# Patient Record
Sex: FEMALE | NOT HISPANIC OR LATINO | URBAN - METROPOLITAN AREA
[De-identification: names, ages, dates, MRNs, and addresses within clinical notes are randomized per-mention and may not be internally consistent; named-entity substitution may affect disease eponyms.]

---

## 2021-11-09 ENCOUNTER — APPOINTMENT (RX ONLY)
Dept: URBAN - METROPOLITAN AREA CLINIC 371 | Facility: CLINIC | Age: 51
Setting detail: DERMATOLOGY
End: 2021-11-09

## 2021-11-09 DIAGNOSIS — D22 MELANOCYTIC NEVI: ICD-10-CM

## 2021-11-09 DIAGNOSIS — D18.0 HEMANGIOMA: ICD-10-CM

## 2021-11-09 DIAGNOSIS — Z71.89 OTHER SPECIFIED COUNSELING: ICD-10-CM

## 2021-11-09 DIAGNOSIS — L81.4 OTHER MELANIN HYPERPIGMENTATION: ICD-10-CM

## 2021-11-09 DIAGNOSIS — L82.1 OTHER SEBORRHEIC KERATOSIS: ICD-10-CM

## 2021-11-09 PROBLEM — D18.01 HEMANGIOMA OF SKIN AND SUBCUTANEOUS TISSUE: Status: ACTIVE | Noted: 2021-11-09

## 2021-11-09 PROBLEM — D22.5 MELANOCYTIC NEVI OF TRUNK: Status: ACTIVE | Noted: 2021-11-09

## 2021-11-09 PROCEDURE — ? COUNSELING

## 2021-11-09 ASSESSMENT — LOCATION DETAILED DESCRIPTION DERM
LOCATION DETAILED: EPIGASTRIC SKIN
LOCATION DETAILED: MIDDLE STERNUM
LOCATION DETAILED: LEFT LATERAL MALAR CHEEK

## 2021-11-09 ASSESSMENT — LOCATION ZONE DERM
LOCATION ZONE: TRUNK
LOCATION ZONE: FACE

## 2021-11-09 ASSESSMENT — LOCATION SIMPLE DESCRIPTION DERM
LOCATION SIMPLE: CHEST
LOCATION SIMPLE: LEFT CHEEK
LOCATION SIMPLE: ABDOMEN

## 2025-03-19 ENCOUNTER — APPOINTMENT (OUTPATIENT)
Dept: URBAN - METROPOLITAN AREA CLINIC 338 | Facility: CLINIC | Age: 55
Setting detail: DERMATOLOGY
End: 2025-03-19

## 2025-03-19 DIAGNOSIS — Z41.9 ENCOUNTER FOR PROCEDURE FOR PURPOSES OTHER THAN REMEDYING HEALTH STATE, UNSPECIFIED: ICD-10-CM

## 2025-03-19 PROCEDURE — ? COSMETIC CONSULTATION: LIPOSUCTION

## 2025-03-19 PROCEDURE — ? COSMETIC CONSULTATION: LASER RESURFACING

## 2025-03-19 PROCEDURE — ? TREATMENT REGIMEN

## 2025-03-19 PROCEDURE — ? ADDITIONAL NOTES

## 2025-03-19 PROCEDURE — ? COSMETIC CONSULTATION: BLEPHAROPLASTY

## 2025-03-19 ASSESSMENT — LOCATION SIMPLE DESCRIPTION DERM
LOCATION SIMPLE: RIGHT INFERIOR EYELID
LOCATION SIMPLE: LEFT INFERIOR EYELID

## 2025-03-19 ASSESSMENT — LOCATION DETAILED DESCRIPTION DERM
LOCATION DETAILED: RIGHT MEDIAL INFERIOR EYELID
LOCATION DETAILED: LEFT MEDIAL INFERIOR EYELID

## 2025-03-19 ASSESSMENT — LOCATION ZONE DERM: LOCATION ZONE: EYELID

## 2025-03-19 NOTE — PROCEDURE: TREATMENT REGIMEN
Detail Level: Zone
Initiate Treatment: Wash face with SBS foaming cleanser\\nApply SBS Eventone a thin layer to face \\nApply Alto advanced a thin layer to face  \\nApply moisturizer with SPF\\n\\nWash face with SBS foaming cleanser \\nApply topix eye cream to area around eyes \\nApply SBS Eventone a thin layer to face \\nApply Alto advanced a thin layer to face  \\nApply moisturizer

## 2025-03-19 NOTE — PROCEDURE: ADDITIONAL NOTES
Additional Notes: Discussed risks, benefits, and expectations of accutite in full detail. Pt interested in breast reduction but discussed accutite will target more of the tightening of the skin. Advised to follow up with a plastic surgeon since accutite would not fulfill the results she is looking for.
Detail Level: Simple
Render Risk Assessment In Note?: no

## 2025-04-05 ENCOUNTER — APPOINTMENT (OUTPATIENT)
Dept: URBAN - METROPOLITAN AREA CLINIC 338 | Facility: CLINIC | Age: 55
Setting detail: DERMATOLOGY
End: 2025-04-05

## 2025-04-05 DIAGNOSIS — Z41.9 ENCOUNTER FOR PROCEDURE FOR PURPOSES OTHER THAN REMEDYING HEALTH STATE, UNSPECIFIED: ICD-10-CM

## 2025-04-05 DIAGNOSIS — L81.4 OTHER MELANIN HYPERPIGMENTATION: ICD-10-CM

## 2025-04-05 PROCEDURE — ? IPL COSMETIC

## 2025-04-05 PROCEDURE — ? COUNSELING

## 2025-04-05 ASSESSMENT — LOCATION DETAILED DESCRIPTION DERM
LOCATION DETAILED: RIGHT INFERIOR CENTRAL MALAR CHEEK
LOCATION DETAILED: LEFT CHIN
LOCATION DETAILED: LEFT INFERIOR CENTRAL MALAR CHEEK
LOCATION DETAILED: RIGHT INFERIOR MEDIAL FOREHEAD

## 2025-04-05 ASSESSMENT — LOCATION SIMPLE DESCRIPTION DERM
LOCATION SIMPLE: RIGHT FOREHEAD
LOCATION SIMPLE: RIGHT CHEEK
LOCATION SIMPLE: LEFT CHEEK
LOCATION SIMPLE: CHIN

## 2025-04-05 ASSESSMENT — LOCATION ZONE DERM: LOCATION ZONE: FACE

## 2025-04-05 NOTE — HPI: COSMETIC (LASER RESURFACING)
Eye Color: zakiya
Have You Had Laser Resurfacing Before?: has not had previous treatments
When Outside In The Sun, Do You...: mildly burns, tans slowly

## 2025-04-05 NOTE — PROCEDURE: IPL COSMETIC
Show Price In Visit Note: No
Number Of Passes: 2
Pulse Energy (Include Units): 1.50hz
Ipl Type: Columba
Location (Required For Details To Render): Full face
Consent: Prior to the procedure consent obtained, risks reviewed including but not limited to crusting, scabbing, blistering, scarring, darker or lighter pigmentary change, incidental hair removal, bruising, and/or incomplete removal.
Pulse Duration (Include Units): 3ms
Eye Protection: Laser Aid
Indication: lentigines and telangiectasia
Wavelength (Include Units): PRODEEP
Post-Care Instructions: I reviewed with the patient in detail post-care instructions. Patient should stay away from the sun and wear sun protection until treated areas are fully healed.
Number Of Passes: 1
Wavelength (Include Units): 515nm filter
Fluence (Include Units): 100mj/cobos
Pre-Procedure Care: Prior to the procedure the patient and all present had protective eyewear in place and a warning sign was placed on the door.
Detail Level: Zone
Spotsize (Include Units): 40x12
Spotsize (Include Units): 8mm
CoolinC
Fluence (Include Units): 14J
Add Additional Location: Yes
Pulse Duration (Include Units): 15ms
End-Point And Post-Procedure Care: The procedure continued until mild purpura was noted.  Immediately following the procedure, Vaseline and ice applied. Post care reviewed with patient.

## 2025-05-06 ENCOUNTER — APPOINTMENT (OUTPATIENT)
Dept: URBAN - METROPOLITAN AREA CLINIC 338 | Facility: CLINIC | Age: 55
Setting detail: DERMATOLOGY
End: 2025-05-06

## 2025-05-06 ENCOUNTER — RX ONLY (RX ONLY)
Age: 55
End: 2025-05-06

## 2025-05-06 DIAGNOSIS — L81.4 OTHER MELANIN HYPERPIGMENTATION: ICD-10-CM

## 2025-05-06 DIAGNOSIS — Z41.9 ENCOUNTER FOR PROCEDURE FOR PURPOSES OTHER THAN REMEDYING HEALTH STATE, UNSPECIFIED: ICD-10-CM

## 2025-05-06 PROCEDURE — ? IPL COSMETIC

## 2025-05-06 PROCEDURE — ? PRESCRIPTION

## 2025-05-06 PROCEDURE — ? COUNSELING

## 2025-05-06 PROCEDURE — ? TREATMENT REGIMEN

## 2025-05-06 RX ORDER — TOBRAMYCIN AND DEXAMETHASONE 3; 1 MG/G; MG/G
OINTMENT OPHTHALMIC
Qty: 3.5 | Refills: 0 | Status: ERX | COMMUNITY
Start: 2025-05-06

## 2025-05-06 RX ORDER — NA MG FL/NA PHO/NACL/HA/NA HYP
GEL (GRAM) TOPICAL
Qty: 80 | Refills: 0 | Status: ERX | COMMUNITY
Start: 2025-05-06

## 2025-05-06 RX ORDER — HYDROCODONE BITARTRATE AND ACETAMINOPHEN 5; 325 MG/1; MG/1
TABLET ORAL
Qty: 16 | Refills: 0 | Status: ERX | COMMUNITY
Start: 2025-05-06

## 2025-05-06 RX ORDER — CEPHALEXIN 500 MG/1
CAPSULE ORAL
Qty: 18 | Refills: 0 | Status: ERX | COMMUNITY
Start: 2025-05-06

## 2025-05-06 RX ORDER — METHYLPREDNISOLONE 4 MG/1
TABLET ORAL
Qty: 1 | Refills: 0 | Status: ERX | COMMUNITY
Start: 2025-05-06

## 2025-05-06 RX ORDER — DIAZEPAM 10 MG/1
TABLET ORAL
Qty: 1 | Refills: 0 | Status: ERX | COMMUNITY
Start: 2025-05-06

## 2025-05-06 RX ADMIN — Medication: at 00:00

## 2025-05-06 RX ADMIN — DIAZEPAM: 10 TABLET ORAL at 00:00

## 2025-05-06 RX ADMIN — TOBRAMYCIN AND DEXAMETHASONE: 3; 1 OINTMENT OPHTHALMIC at 00:00

## 2025-05-06 RX ADMIN — CEPHALEXIN: 500 CAPSULE ORAL at 00:00

## 2025-05-06 RX ADMIN — HYDROCODONE BITARTRATE AND ACETAMINOPHEN: 5; 325 TABLET ORAL at 00:00

## 2025-05-06 ASSESSMENT — LOCATION SIMPLE DESCRIPTION DERM
LOCATION SIMPLE: CHIN
LOCATION SIMPLE: RIGHT CHEEK
LOCATION SIMPLE: RIGHT FOREHEAD
LOCATION SIMPLE: LEFT CHEEK

## 2025-05-06 ASSESSMENT — LOCATION DETAILED DESCRIPTION DERM
LOCATION DETAILED: LEFT CHIN
LOCATION DETAILED: LEFT INFERIOR CENTRAL MALAR CHEEK
LOCATION DETAILED: RIGHT INFERIOR MEDIAL FOREHEAD
LOCATION DETAILED: RIGHT INFERIOR CENTRAL MALAR CHEEK

## 2025-05-06 ASSESSMENT — LOCATION ZONE DERM: LOCATION ZONE: FACE

## 2025-05-06 NOTE — PROCEDURE: IPL COSMETIC
Show Price In Visit Note: No
Number Of Passes: 2
Pulse Energy (Include Units): 1.50hz
Ipl Type: Columba
Location (Required For Details To Render): Full face
Consent: Prior to the procedure consent obtained, risks reviewed including but not limited to crusting, scabbing, blistering, scarring, darker or lighter pigmentary change, incidental hair removal, bruising, and/or incomplete removal.
Pulse Duration (Include Units): 3ms
Eye Protection: Laser Aid
Indication: lentigines and telangiectasia
Wavelength (Include Units): PRODEEP
Post-Care Instructions: I reviewed with the patient in detail post-care instructions. Patient should stay away from the sun and wear sun protection until treated areas are fully healed.
Number Of Passes: 1
Wavelength (Include Units): 540nm filter
Fluence (Include Units): 110mj/cobos
Pre-Procedure Care: Prior to the procedure the patient and all present had protective eyewear in place and a warning sign was placed on the door.
Detail Level: Zone
Spotsize (Include Units): 40x12
Spotsize (Include Units): 8mm
CoolinC
Fluence (Include Units): 15J
Add Additional Location: Yes
Pulse Duration (Include Units): 15ms
End-Point And Post-Procedure Care: The procedure continued until mild purpura was noted.  Immediately following the procedure, Vaseline and ice applied. Post care reviewed with patient.

## 2025-05-06 NOTE — PROCEDURE: TREATMENT REGIMEN
Detail Level: Zone
Plan: Called eye doctor's office request most recent eye exam note - faxed to our office\\nconsent reviewed & signed\\nBring all prescriptions to the appointment\\n\\nWash area with Acuicyn eye wash the day of then once daily\\nApply Tobradex ointment to the areas twice daily\\n\\nOrally:\\n-Take 1 tablet of hydrocodone once roomed by medical assistant for procedure then 1 tablet every 4-6 hours as needed for pain\\n-Take one tablet of valium on arrival for procedure\\n-Take 4 tabs of kelfex once roomed by medical assistant then take one tab twice daily x 7 days\\n- Take day one of medrol dose pack with breakfast the day of then finish the following day and forward until finished as directed.\\nAvoid caffeine for 24 hours prior to procedure

## 2025-05-09 ENCOUNTER — RX ONLY (RX ONLY)
Age: 55
End: 2025-05-09

## 2025-05-09 RX ORDER — ERYTHROMYCIN 5 MG/G
OINTMENT OPHTHALMIC
Qty: 3.5 | Refills: 1 | Status: ERX | COMMUNITY
Start: 2025-05-09

## 2025-05-10 ENCOUNTER — APPOINTMENT (OUTPATIENT)
Dept: URBAN - METROPOLITAN AREA CLINIC 337 | Facility: CLINIC | Age: 55
Setting detail: DERMATOLOGY
End: 2025-05-10

## 2025-05-10 DIAGNOSIS — Z41.9 ENCOUNTER FOR PROCEDURE FOR PURPOSES OTHER THAN REMEDYING HEALTH STATE, UNSPECIFIED: ICD-10-CM

## 2025-05-10 PROCEDURE — ? BLEPHAROPLASTY

## 2025-05-10 ASSESSMENT — LOCATION ZONE DERM: LOCATION ZONE: EYELID

## 2025-05-10 ASSESSMENT — LOCATION DETAILED DESCRIPTION DERM: LOCATION DETAILED: RIGHT SUPRATARSAL CREASE

## 2025-05-10 ASSESSMENT — LOCATION SIMPLE DESCRIPTION DERM: LOCATION SIMPLE: RIGHT SUPERIOR EYELID

## 2025-05-10 NOTE — PROCEDURE: BLEPHAROPLASTY
Detail Level: Simple
Estimated Blood Loss (Cc): minimal
Intro: The patient was prepped with 0.01% hypochlorous acid in dilute saline solution on the skin, and a few drops of tetracaine solution were placed in the interior fornix. A drape was placed over the eyes in the usual sterile fashion.
Upper Eyelid Blepharoplasty: A 15 blade was used to make a skin incision along the elliptical demarcation. Scissors were used to excise the skin and underlying orbicularis muscle.  All active bleeding points were meticulously controlled with electrocautery.
Skin Closure Sutures: 6-0 Prolene
Lower Eyelid Blepharoplasty I: A 4-0 silk traction suture was placed through the lower lid margins and traction was applied superiorly. A 15 blade was used to make an infraciliary incision across the length of the eyelid extending  beyond the lateral commissure as needed. A skin muscle flap was dissected   down to the level of the interior orbital rim. The nasal, central, and temporal fat compartments were exposed and these were also trimmed back to the level of the inferior orbital rim with a conservative fat resection removal to debulk the herniation of the fat compartment. Meticulous hemostasis was maintained.
Canthotomy: A lateral canthotomy and cantholyis of the inferior nadia was performed. A lateral canthal tendon was created in the usual standard manner and secured to the lateral orbital rim periosteum with 2 interrupted 5-0 Prolene sutures. The lateral commissure was reestablished joining the upper and lower eyelids at the gray line with interrupted 7-0 vicryl suture with the knot buried in the soft tissues.
Lower Eyelid Blepharoplasty Ii: An overlappping technique was then utilized to dermine the amount of redundant skin and muscle to be excised from lower eyelids. This determination revealed a 3mm amount of vertical skin to be excised which was performed across the length of the skin flap.
Skin Closure: simple interrupted
Temporary Frost: The previously placed traction sutures was now secured in place to the forehead with Steri-Strips and Mastisol solution as a temporary Frost suture.
Wound Care Applied To Incision Site: Petrolatum
Consent: The risks, benefits and alternatives of blepharoplasty were discussed with the patient. The patient read and signed the consent form, was identified, and was seated in the exam chair. In the preoperative area with the patient positioned upright on the stretcher, a marker pen was used to delineate the natural lid creases in the affected lids, and the amount of redundant skin and muscle to be removed utilizing a pinch technique. An intravenous line was established and cardiac monitors were placed.
Post-Care Instructions: An ice compress was applied over the eyes. At the conclusion of the procedure, the patient left the operating room in good condition.   The patient was advised to call immediately for any pain, lid swelling or tenderness, discharge, or loss of vision. The patient will return in several days for a postoperative exam.

## 2025-05-12 NOTE — HPI: FULL BODY SKIN EXAMINATION
Track your caloric intake for 3-5 days  
What Is The Reason For Today's Visit?: Annual Full Body Skin Examination with No Concerns
What Is The Reason For Today's Visit? (Being Monitored For X): concerning skin lesions on an annual basis
How Severe Are Your Spot(S)?: mild

## 2025-05-19 ENCOUNTER — APPOINTMENT (OUTPATIENT)
Dept: URBAN - METROPOLITAN AREA CLINIC 338 | Facility: CLINIC | Age: 55
Setting detail: DERMATOLOGY
End: 2025-05-19

## 2025-05-19 DIAGNOSIS — Z48.02 ENCOUNTER FOR REMOVAL OF SUTURES: ICD-10-CM

## 2025-05-19 PROCEDURE — ? SUTURE REMOVAL

## 2025-05-19 PROCEDURE — ? COUNSELING

## 2025-05-19 ASSESSMENT — LOCATION DETAILED DESCRIPTION DERM
LOCATION DETAILED: RIGHT LATERAL SUPERIOR EYELID
LOCATION DETAILED: LEFT LATERAL SUPERIOR EYELID

## 2025-05-19 ASSESSMENT — LOCATION SIMPLE DESCRIPTION DERM
LOCATION SIMPLE: RIGHT SUPERIOR EYELID
LOCATION SIMPLE: LEFT SUPERIOR EYELID

## 2025-05-19 ASSESSMENT — LOCATION ZONE DERM: LOCATION ZONE: EYELID

## 2025-06-16 ENCOUNTER — APPOINTMENT (OUTPATIENT)
Dept: URBAN - METROPOLITAN AREA CLINIC 338 | Facility: CLINIC | Age: 55
Setting detail: DERMATOLOGY
End: 2025-06-16

## 2025-06-16 DIAGNOSIS — Z48.817 ENCOUNTER FOR SURGICAL AFTERCARE FOLLOWING SURGERY ON THE SKIN AND SUBCUTANEOUS TISSUE: ICD-10-CM

## 2025-06-16 DIAGNOSIS — Z41.9 ENCOUNTER FOR PROCEDURE FOR PURPOSES OTHER THAN REMEDYING HEALTH STATE, UNSPECIFIED: ICD-10-CM

## 2025-06-16 DIAGNOSIS — L81.4 OTHER MELANIN HYPERPIGMENTATION: ICD-10-CM

## 2025-06-16 PROCEDURE — ? IPL COSMETIC

## 2025-06-16 PROCEDURE — ? POST-OP WOUND CHECK

## 2025-06-16 PROCEDURE — ? COUNSELING

## 2025-06-16 PROCEDURE — ? TREATMENT REGIMEN

## 2025-06-16 PROCEDURE — ? COSMETIC FOLLOW-UP

## 2025-06-16 ASSESSMENT — LOCATION ZONE DERM
LOCATION ZONE: FACE
LOCATION ZONE: EYELID
LOCATION ZONE: FACE

## 2025-06-16 ASSESSMENT — LOCATION DETAILED DESCRIPTION DERM
LOCATION DETAILED: LEFT LATERAL EYEBROW
LOCATION DETAILED: RIGHT INFERIOR MEDIAL FOREHEAD
LOCATION DETAILED: RIGHT INFERIOR CENTRAL MALAR CHEEK
LOCATION DETAILED: LEFT CHIN
LOCATION DETAILED: LEFT INFERIOR CENTRAL MALAR CHEEK
LOCATION DETAILED: RIGHT LATERAL SUPERIOR EYELID
LOCATION DETAILED: LEFT LATERAL SUPERIOR EYELID

## 2025-06-16 ASSESSMENT — LOCATION SIMPLE DESCRIPTION DERM
LOCATION SIMPLE: RIGHT FOREHEAD
LOCATION SIMPLE: RIGHT CHEEK
LOCATION SIMPLE: LEFT CHEEK
LOCATION SIMPLE: RIGHT SUPERIOR EYELID
LOCATION SIMPLE: CHIN
LOCATION SIMPLE: LEFT SUPERIOR EYELID
LOCATION SIMPLE: LEFT EYEBROW

## 2025-06-16 NOTE — PROCEDURE: IPL COSMETIC
Show Price In Visit Note: No
Number Of Passes: 2
Pulse Energy (Include Units): 1.50hz
Ipl Type: Columba
Location (Required For Details To Render): Full face
Consent: Prior to the procedure consent obtained, risks reviewed including but not limited to crusting, scabbing, blistering, scarring, darker or lighter pigmentary change, incidental hair removal, bruising, and/or incomplete removal.
Pulse Duration (Include Units): 3ms
Eye Protection: Laser Aid
Indication: lentigines and telangiectasia
Wavelength (Include Units): PRODEEP
Post-Care Instructions: I reviewed with the patient in detail post-care instructions. Patient should stay away from the sun and wear sun protection until treated areas are fully healed.
Number Of Passes: 1
Wavelength (Include Units): 540nm filter
Fluence (Include Units): 110mj/cobos
Pre-Procedure Care: Prior to the procedure the patient and all present had protective eyewear in place and a warning sign was placed on the door.
Detail Level: Zone
Spotsize (Include Units): 40x12
Spotsize (Include Units): 8mm
CoolinC
Fluence (Include Units): 16J
Add Additional Location: Yes
Pulse Duration (Include Units): 15ms
End-Point And Post-Procedure Care: The procedure continued until mild purpura was noted.  Immediately following the procedure, Vaseline and ice applied. Post care reviewed with patient.

## 2025-06-16 NOTE — PROCEDURE: COSMETIC FOLLOW-UP
Side Effects Or Complications: None
Global Improvement: Very Good
Patient Satisfaction: Pleased
Treatment (Optional): Blepharoplasty
Detail Level: Zone

## 2025-07-22 ENCOUNTER — APPOINTMENT (OUTPATIENT)
Dept: URBAN - METROPOLITAN AREA CLINIC 338 | Facility: CLINIC | Age: 55
Setting detail: DERMATOLOGY
End: 2025-07-22

## 2025-07-22 DIAGNOSIS — L81.4 OTHER MELANIN HYPERPIGMENTATION: ICD-10-CM

## 2025-07-22 PROCEDURE — ? COUNSELING

## 2025-07-22 PROCEDURE — ? IPL COSMETIC

## 2025-07-22 ASSESSMENT — LOCATION ZONE DERM: LOCATION ZONE: FACE

## 2025-07-22 ASSESSMENT — LOCATION SIMPLE DESCRIPTION DERM
LOCATION SIMPLE: LEFT CHEEK
LOCATION SIMPLE: RIGHT FOREHEAD
LOCATION SIMPLE: RIGHT CHEEK
LOCATION SIMPLE: CHIN

## 2025-07-22 NOTE — PROCEDURE: IPL COSMETIC
Show Price In Visit Note: No
Number Of Passes: 1
Pulse Energy (Include Units): 1.50hz
Ipl Type: Columba
Location (Required For Details To Render): Full face
Consent: Prior to the procedure consent obtained, risks reviewed including but not limited to crusting, scabbing, blistering, scarring, darker or lighter pigmentary change, incidental hair removal, bruising, and/or incomplete removal.
Pulse Duration (Include Units): 3ms
Eye Protection: Laser Aid
Indication: lentigines and telangiectasia
Wavelength (Include Units): PRODEEP
Post-Care Instructions: I reviewed with the patient in detail post-care instructions. Patient should stay away from the sun and wear sun protection until treated areas are fully healed.
Wavelength (Include Units): 540nm filter
Fluence (Include Units): 120mj/cobos
Pre-Procedure Care: Prior to the procedure the patient and all present had protective eyewear in place and a warning sign was placed on the door.
Detail Level: Zone
Spotsize (Include Units): 40x12
Treatment Number: 4
Spotsize (Include Units): 8mm
CoolinC
Fluence (Include Units): 16J
Add Additional Location: Yes
Pulse Duration (Include Units): 15ms
End-Point And Post-Procedure Care: The procedure continued until mild purpura was noted.  Immediately following the procedure, Vaseline and ice applied. Post care reviewed with patient.